# Patient Record
(demographics unavailable — no encounter records)

---

## 2025-05-09 NOTE — PHYSICAL EXAM
[] : septum deviated to the left [Normal] : mucosa is normal [Midline] : trachea located in midline position [de-identified] : (-)modified Harlan's maneuver bilaterally

## 2025-05-09 NOTE — HISTORY OF PRESENT ILLNESS
[de-identified] : - 5/9/25 30 y/o F presents with nasal issues lingering after URI in February 2025. Describes nasal congestion that is intermittent and bilateral, worse at night in the morning. Also blood-tinged nasal drainage most times when she would blow her nose on both sides. Admits to nasal dryness and feeling that her apartment was dry. No humera epistaxis. No rhinorrhea, sinus pressure, hyposmia. No nasal treatments. No history of nasal or sinus issues and no history of epistaxis. Symptoms have overall improved in the last 2 weeks. Nasal congestion mild still in the mornings and at night.

## 2025-05-09 NOTE — REASON FOR VISIT
[Initial Consultation] : an initial consultation for [FreeTextEntry2] : nasal congestion and bloody-nasal drainage

## 2025-05-09 NOTE — PHYSICAL EXAM
[] : septum deviated to the left [Normal] : mucosa is normal [Midline] : trachea located in midline position [de-identified] : (-)modified Effingham's maneuver bilaterally

## 2025-05-09 NOTE — ASSESSMENT
[FreeTextEntry1] :  - 5/9/25 28 y/o F with mild nasal congestion in the night/mornings and blood-tinged nasal drainage onset Feb 2025. Nasal exam with evidence of mild left septal deviation. No evidence of sinusitis, polyps or lesions. No prominent vasculature and no significant nasal valve collapse. At this time I am recommending nasal saline irrigations and nasal steroids as needed as well as precautions to mitigate nasal dryness during sleep, such as increased hydration, humidifier at night.  If symptoms worsen or fail to improve return for follow up.    -Increased hydration, humidifier -Nasal saline irrigations +/- nasal steroids as needed -Follow up as needed

## 2025-05-09 NOTE — HISTORY OF PRESENT ILLNESS
[de-identified] : - 5/9/25 28 y/o F presents with nasal issues lingering after URI in February 2025. Describes nasal congestion that is intermittent and bilateral, worse at night in the morning. Also blood-tinged nasal drainage most times when she would blow her nose on both sides. Admits to nasal dryness and feeling that her apartment was dry. No humera epistaxis. No rhinorrhea, sinus pressure, hyposmia. No nasal treatments. No history of nasal or sinus issues and no history of epistaxis. Symptoms have overall improved in the last 2 weeks. Nasal congestion mild still in the mornings and at night.

## 2025-05-09 NOTE — PROCEDURE
[FreeTextEntry6] : - Procedure: Bilateral nasal endoscopy Pre-operative Diagnosis: Nasal congestion Post-operative Diagnosis: Mild left septal deviation Anesthesia: Topical lidocaine and topical oxymetazoline   Procedure Details: After topical anesthesia and decongestant, the patient was placed in the supine position. The endoscope was passed along the left nasal floor to the nasopharynx. It was then passed into the region of the middle meatus, middle turbinate, and the sphenoethmoid region. An identical procedure was performed on the right side.   Findings: Mucosa:   normal Nasal septum: deviated to the left (mild) Discharge:  none Turbinates:  normal Adenoids:   normal Posterior choanae:  normal Eustachian tube orifices:  patent Mucous stranding:  normal Lesions:   Not present